# Patient Record
(demographics unavailable — no encounter records)

---

## 2024-12-10 NOTE — HISTORY OF PRESENT ILLNESS
[de-identified] : Patient is 81-year-old female here for evaluation of right knee osteoarthritis.  Patient recently had Synvisc injections in June 2024.  Patient states the injections worked well.  Patient states pain started coming back about a month ago slowly without any injury or trauma.  Denies numbness tingling, denies instability  Right knee exam: No effusion no ecchymosis no erythema tender to palpation medial joint line range of motion 0 degrees to 120 degrees with discomfort no gross instability neurovascular intact  Reviewed prior x-rays in detail showing osteoarthritis right knee  Discussed treatment options in detail with patient for osteoarthritis including conservative versus surgical intervention.  Patient did well with viscosupplementation injections, repeat viscosupplementation injections ordered to be administered in a month.  Patient will follow-up for injections will call if any questions or concerns.

## 2025-01-21 NOTE — HISTORY OF PRESENT ILLNESS
[de-identified] : Patient is 81-year-old female here for reevaluation of right knee osteoarthritis and for Synvisc injections.  Right knee exam: No effusion no ecchymosis no erythema tender to palpation medial joint line range of motion 0 degrees to 120 degrees with discomfort no gross instability neurovascular intact  Reviewed prior x-rays in detail showing osteoarthritis right knee     An injection of Synvisc was injected into right knee after verbal consent using sterile technique. The risks, benefits, and alternatives to Visco-supplementation injection were explained in full to the patient. Risks outlined include but are not limited to infection, sepsis, bleeding, scarring, skin discoloration, temporary increase in pain, syncopal episode, failure to resolve symptoms, allergic reaction, and symptom recurrence. Signs and symptoms of infection reviewed, and patients advised to call immediately for redness, fevers, and/or chills. Patient understood the risks. All questions were answered. Sterile technique was used without complications. The patient tolerated the procedure well. Ice tonight to the injection site.        We will follow-up in 1 week for second injection.  Call if any questions or concerns.  Patient understands agrees with plan.

## 2025-01-28 NOTE — HISTORY OF PRESENT ILLNESS
[de-identified] : Patient is 81-year-old female here for reevaluation of right knee osteoarthritis and for Synvisc injections.  Right knee exam: No effusion no ecchymosis no erythema tender to palpation medial joint line range of motion 0 degrees to 120 degrees with discomfort no gross instability neurovascular intact  Reviewed prior x-rays in detail showing osteoarthritis right knee     An injection of Synvisc was injected into right knee after verbal consent using sterile technique. The risks, benefits, and alternatives to Visco-supplementation injection were explained in full to the patient. Risks outlined include but are not limited to infection, sepsis, bleeding, scarring, skin discoloration, temporary increase in pain, syncopal episode, failure to resolve symptoms, allergic reaction, and symptom recurrence. Signs and symptoms of infection reviewed, and patients advised to call immediately for redness, fevers, and/or chills. Patient understood the risks. All questions were answered. Sterile technique was used without complications. The patient tolerated the procedure well. Ice tonight to the injection site.        We will follow-up in 1 week for third injection.  Call if any questions or concerns.  Patient understands agrees with plan.

## 2025-02-04 NOTE — HISTORY OF PRESENT ILLNESS
[de-identified] : Patient is 81-year-old female here for reevaluation of right knee osteoarthritis and for Synvisc injections.  Right knee exam: No effusion no ecchymosis no erythema tender to palpation medial joint line range of motion 0 degrees to 120 degrees with discomfort no gross instability neurovascular intact  Reviewed prior x-rays in detail showing osteoarthritis right knee     An injection of Synvisc was injected into right knee after verbal consent using sterile technique. The risks, benefits, and alternatives to Visco-supplementation injection were explained in full to the patient. Risks outlined include but are not limited to infection, sepsis, bleeding, scarring, skin discoloration, temporary increase in pain, syncopal episode, failure to resolve symptoms, allergic reaction, and symptom recurrence. Signs and symptoms of infection reviewed, and patients advised to call immediately for redness, fevers, and/or chills. Patient understood the risks. All questions were answered. Sterile technique was used without complications. The patient tolerated the procedure well. Ice tonight to the injection site.        We will follow-up in 4 to 6 months for evaluation call if any questions or concerns.  Patient understands agrees with plan.

## 2025-07-08 NOTE — HISTORY OF PRESENT ILLNESS
[de-identified] : Patient is 81-year-old female here for reevaluation of right knee osteoarthritis.  Patient last had Visco supplementation injections in January with no improvement.  Patient states pain is constant worsens with activities.  Denies numbness or tingling.  Right knee exam: No effusion no ecchymosis no erythema tender to palpation medial joint line range of motion 0 degrees to 105 degrees with discomfort no gross instability neurovascular intact, calf soft nontender  X-ray bilateral knees 2 views for comparison: No acute fractures, subluxations, dislocations, advanced tricompartmental osteoarthritis right knee.  Moderate osteoarthritis left knee  Discussed with patient detail that she has advanced arthritis of right knee.  At this point patient has failed conservative treatment with physical therapy, injections, medications.  Discussed further treatment options in detail including continuation of conservative treatment/pain management versus surgical intervention.  Discussed total knee replacement detail with patient.  Plan is for right total knee replacement.  We discussed the surgery, including a description of the surgery in layman's terms, preoperative evaluation, the hospital stay, anesthesia, and expected outcome.     Models equivalent to the actual knee replacement prosthesis were used to demonstrate the magnitude and scope of the surgery.  Various modes of implant fixation including cement and biologic fixation were described.  The patient shared in the decision making and agreed to the use of implants.       Additionally surgical risks were discussed. The patient has a good understanding of the inherent risks of surgery which include bleeding, pain, and infection, blood clots, and any medical issues that may arise in the perioperative period.  Additionally, we discussed risks uniquely pertinent to knee replacement surgery, including arthrofibrosis, instability, loosening, numbness around the incision, nerve injury, and possible need for revision surgery should the replacement not function optimally.  All questions were answered and the patient verbalized understanding. I encouraged the patient to contact me should any further questions or issues arise.      Patient will require a rolling walker 2-4 weeks post-op for ambulation and ADLs   Patient will also require commode as they are confined to one level without a bathroom   Patient is able to safely use a walker and functional mobility deficit can be sufficiently resolved with use of walker    Seen w Dr. Nuñez